# Patient Record
Sex: MALE | Race: WHITE | URBAN - NONMETROPOLITAN AREA
[De-identification: names, ages, dates, MRNs, and addresses within clinical notes are randomized per-mention and may not be internally consistent; named-entity substitution may affect disease eponyms.]

---
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
## 2023-11-28 ENCOUNTER — HOSPITAL ENCOUNTER (EMERGENCY)
Age: 18
Discharge: HOME | End: 2023-11-28
Payer: COMMERCIAL

## 2023-11-28 VITALS
HEIGHT: 72 IN | SYSTOLIC BLOOD PRESSURE: 167 MMHG | OXYGEN SATURATION: 96 % | TEMPERATURE: 97.4 F | WEIGHT: 240 LBS | RESPIRATION RATE: 18 BRPM | HEART RATE: 107 BPM | BODY MASS INDEX: 32.51 KG/M2 | DIASTOLIC BLOOD PRESSURE: 95 MMHG

## 2023-11-28 DIAGNOSIS — U07.1 COVID-19: Primary | ICD-10-CM

## 2023-11-28 PROCEDURE — 99203 OFFICE O/P NEW LOW 30 MIN: CPT | Performed by: NURSE PRACTITIONER

## 2023-11-28 PROCEDURE — 99203 OFFICE O/P NEW LOW 30 MIN: CPT

## 2023-11-28 RX ORDER — BENZONATATE 100 MG/1
100 CAPSULE ORAL 3 TIMES DAILY PRN
Qty: 20 CAPSULE | Refills: 0 | Status: SHIPPED | OUTPATIENT
Start: 2023-11-28 | End: 2023-12-05

## 2023-11-28 RX ORDER — ALBUTEROL SULFATE 90 UG/1
2 AEROSOL, METERED RESPIRATORY (INHALATION) EVERY 6 HOURS PRN
Qty: 1 EACH | Refills: 0 | Status: SHIPPED | OUTPATIENT
Start: 2023-11-28

## 2023-11-28 ASSESSMENT — ENCOUNTER SYMPTOMS
RHINORRHEA: 0
SORE THROAT: 0
VOMITING: 0
COUGH: 1
DIARRHEA: 0
NAUSEA: 0
EYE REDNESS: 0
EYE DISCHARGE: 0
TROUBLE SWALLOWING: 0
CHEST TIGHTNESS: 1
SHORTNESS OF BREATH: 0

## 2023-11-28 ASSESSMENT — PAIN - FUNCTIONAL ASSESSMENT: PAIN_FUNCTIONAL_ASSESSMENT: NONE - DENIES PAIN

## 2023-11-28 NOTE — ED NOTES
Patient discharge instructions given to pt and pt verbalized understanding, no other needs at this time, and pt left in stable condition.       Holland Ghosh RN  11/28/23 6220

## 2023-11-28 NOTE — ED TRIAGE NOTES
To room with c/o cough and headache that started Saturday.    Exposed to [facility]